# Patient Record
Sex: FEMALE | Race: OTHER | Employment: OTHER | URBAN - METROPOLITAN AREA
[De-identification: names, ages, dates, MRNs, and addresses within clinical notes are randomized per-mention and may not be internally consistent; named-entity substitution may affect disease eponyms.]

---

## 2020-01-27 NOTE — PATIENT DISCUSSION
DERMATOCHALASIS / PTOSIS RUL AND RORO :  VISUALLY SIGNIFICANT TO PATIENT. SCHEDULE WITH OCULOPLASTIC SPECIALIST IF PATIENT DESIRES.

## 2020-01-27 NOTE — PATIENT DISCUSSION
New Prescription: prednisoln tn-vznfipni-jibrmgn (prednisoln wl-rgbcmqxr-unexjuo): drops: 1-0.5-0.075% 1 drop three times a day into left eye 01-

## 2020-01-27 NOTE — PATIENT DISCUSSION
Surgery Counseling:  I have discussed the option of glasses versus cataract surgery versus following, It was explained that when vision no longer meets the patient's visual needs and a new prescription for glasses is not likely to improve the patient's visual symptoms, the option of cataract surgery is a reasonable next step. It was explained that there is no guarantee that removing the cataract will improve their visual symptoms; however, it is believed that the cataract is contributing to the patient's visual impairment and surgery may noticeably improve both the visual and functional status of the patient. After this discussion, the patient desires to proceed with cataract surgery with implantation of an intraocular lens to improve their vision for golfing &amp; watching TV.

## 2020-01-27 NOTE — PATIENT DISCUSSION
CATARACT, OU - VISUALLY SIGNIFICANT. SCHEDULE PHACO WITH IOL OS SET FOR DISTANCE FIRST THEN OD SET FOR DISTANCE IF VISUAL SYMPTOMS PERSIST. GLASSES RX GIVEN TO FILL IF DESIRES IN THE EVENT PATIENT DOES NOT PROCEED WITH SURGERY. NO CLEARANCE FROM CARDIOLOGIST NEEDED.

## 2020-02-12 NOTE — PATIENT DISCUSSION
Continue: prednisoln pq-scalonqw-doneiwq (prednisoln pj-pnpjpgsj-ppzxlem): drops: 1-0.5-0.075% 1 drop three times a day into left eye 01-

## 2020-02-12 NOTE — PATIENT DISCUSSION
Post-Op Instructions: The patient was instructed in the proper use of post-operative eye drops: pred-gati-brom in the surgical eye 3 times per day until bottle is finished. Call back instructions, retinal detachment and endophthalmitis precautions given.

## 2020-02-17 NOTE — PATIENT DISCUSSION
*S/P PC IOL, OS: DOING WELL. PATIENT CAME IN STATING SHE IS OUT OF DROPS. RECOMMENDED USING ADDITIONAL BOTTLE TO CONTINUE USING DROPS IN OS FOR PRESCRIBED 2-3 WEEKS POSTOP, UNTIL THE BOTTLE RUNS OUT. OK TO PROCEED WITH THE 2ND EYE CATARACT SURGERY AS SCHEDULED.

## 2020-02-17 NOTE — PATIENT DISCUSSION
Start two days prior to surgery in right eye and continue as directed. Continue to use in left eye for another 2 weeks, until the drops run out.

## 2020-02-17 NOTE — PATIENT DISCUSSION
Continue: prednisoln pq-rjjxcfda-mvphhew (prednisoln ny-lauvllkr-assbbkx): drops: 1-0.5-0.075% 1 drop three times a day into both eyes 02-

## 2020-02-17 NOTE — PATIENT DISCUSSION
*Pre-Op 2nd Eye Counseling: The patient has noticed an improvement in their visual symptoms in the operative eye. The patient complains of decreased vision in the fellow eye when driving safely,  seeing her golf ball at a distance and watching TV. She is also bothered by glare from headlights and sunlight. .    It was explained to the patient that the decision to proceed with cataract surgery in the fellow eye is entirely a separate decision from the surgical eye. All of the same risks, benefits and alternatives ere reviewed with the patient again. The patient does feel the vision in the non-operative eye is limiting their daily activities and elects to proceed with surgery in the cataract eye.

## 2020-02-17 NOTE — PATIENT DISCUSSION
CATARACT, OD: VISUALLY SIGNIFICANT. SCHEDULE PHACO WITH IOL. IOL MASTER REVIEWED AND INTERPRETED ON H&amp;P SHEET FOR OD TODAY. CONSENTS FOR SECOND EYE DISCUSSED WITH PATIENT TODAY. PATIENT UNDERSTANDS AND HAS NO FURTHER QUESTIONS. ADDITIONAL REFILL OF DROPS SENT IN TO PHARMACY TODAY.

## 2020-03-04 NOTE — PATIENT DISCUSSION
Continue: prednisoln km-kavguule-ksxjzbg (prednisoln lm-kvkgkozp-zxnxpds): drops: 1-0.5-0.075% 1 drop three times a day into both eyes 02-

## 2022-10-27 ENCOUNTER — NEW PATIENT (OUTPATIENT)
Dept: URBAN - METROPOLITAN AREA CLINIC 39 | Facility: CLINIC | Age: 87
End: 2022-10-27

## 2022-10-27 DIAGNOSIS — H02.832: ICD-10-CM

## 2022-10-27 DIAGNOSIS — H02.835: ICD-10-CM

## 2022-10-27 PROCEDURE — 92285 EXTERNAL OCULAR PHOTOGRAPHY: CPT

## 2022-10-27 PROCEDURE — 99202 OFFICE O/P NEW SF 15 MIN: CPT

## 2022-10-27 ASSESSMENT — VISUAL ACUITY
OD_PH: 20/40
OD_SC: 20/80-1
OS_SC: 20/30
